# Patient Record
Sex: FEMALE | Race: WHITE
[De-identification: names, ages, dates, MRNs, and addresses within clinical notes are randomized per-mention and may not be internally consistent; named-entity substitution may affect disease eponyms.]

---

## 2018-07-31 ENCOUNTER — HOSPITAL ENCOUNTER (OUTPATIENT)
Dept: HOSPITAL 47 - RADUSWWP | Age: 48
Discharge: HOME | End: 2018-07-31
Attending: PHYSICAL MEDICINE & REHABILITATION
Payer: COMMERCIAL

## 2018-07-31 DIAGNOSIS — M25.452: ICD-10-CM

## 2018-07-31 DIAGNOSIS — M16.10: ICD-10-CM

## 2018-07-31 DIAGNOSIS — M25.852: Primary | ICD-10-CM

## 2018-07-31 DIAGNOSIS — M25.851: ICD-10-CM

## 2018-07-31 DIAGNOSIS — E66.9: ICD-10-CM

## 2018-07-31 NOTE — US
EXAMINATION TYPE:  US MSK bilateral hips, gluteal tendons 

 

DATE OF EXAM: 7/31/2018

 

COMPARISON: NONE

 

CLINICAL HISTORY: 48-year-old female with bilateral hip pain for 6 years, worsening over the last 2 y
ears. Pain is worse when standing and wall thickening while driving. M76.0, M76.02  Gluteal tendiniti
s, Bilateral hip.

 

Technique: Targeted sonographic examination along the lateral aspect of the hips to assess the latera
l gluteus medius insertions.

 

FINDINGS:

On the right, the insertion of the gluteus medius onto the lateral facet of the greater trochanter ha
s a hypoechoic appearance. There is mild enthesopathic change present. No lewis tear is identified or
 significant effusion.

 

On the left, there is bony irregularity along the lateral facet of the greater trochanter with hypoec
hoic appearance to the insertional fibers of the gluteus medius here. Possible fluid undercutting a p
ortion of the insertion especially at the sites of greater bony irregularity, for example, images 29,
 30, and 38. A small effusion in the subgluteus medius bursa may be present, image 31. No retracted t
ear is seen.

 

Exam limitations due to prominent subcutaneous adipose layer.

 

 

 

IMPRESSION:  

1. Targeted scanning along the lateral insertion of the bilateral gluteus medius.

2. 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

3. There is an underlying small subgluteus medius bursal effusion on the left as well.

4. If confirmation or further characterization is desired, MRI could be considered.

## 2018-08-09 ENCOUNTER — HOSPITAL ENCOUNTER (OUTPATIENT)
Dept: HOSPITAL 47 - LABWHC1 | Age: 48
Discharge: HOME | End: 2018-08-09
Payer: COMMERCIAL

## 2018-08-09 DIAGNOSIS — E27.40: Primary | ICD-10-CM

## 2018-08-09 PROCEDURE — 82024 ASSAY OF ACTH: CPT

## 2018-08-09 PROCEDURE — 36415 COLL VENOUS BLD VENIPUNCTURE: CPT

## 2018-08-09 PROCEDURE — 82533 TOTAL CORTISOL: CPT

## 2022-06-16 ENCOUNTER — HOSPITAL ENCOUNTER (OUTPATIENT)
Dept: HOSPITAL 47 - SLEEP | Age: 52
End: 2022-06-16
Attending: INTERNAL MEDICINE
Payer: MEDICARE

## 2022-06-16 DIAGNOSIS — I10: ICD-10-CM

## 2022-06-16 DIAGNOSIS — Z90.13: ICD-10-CM

## 2022-06-16 DIAGNOSIS — Z88.0: ICD-10-CM

## 2022-06-16 DIAGNOSIS — Z87.19: ICD-10-CM

## 2022-06-16 DIAGNOSIS — Z87.39: ICD-10-CM

## 2022-06-16 DIAGNOSIS — Z88.1: ICD-10-CM

## 2022-06-16 DIAGNOSIS — Z85.3: ICD-10-CM

## 2022-06-16 DIAGNOSIS — J45.909: ICD-10-CM

## 2022-06-16 DIAGNOSIS — Z87.891: ICD-10-CM

## 2022-06-16 DIAGNOSIS — E66.9: ICD-10-CM

## 2022-06-16 DIAGNOSIS — G47.33: Primary | ICD-10-CM

## 2022-06-16 PROCEDURE — 99202 OFFICE O/P NEW SF 15 MIN: CPT

## 2022-06-16 NOTE — CONS
CONSULTATION



DATE OF SERVICE:

06/16/2022



52-year-old lady has been evaluated in [facility] Center for possible obstructive sleep

apnea-hypopnea syndrome.



HISTORY OF PRESENT ILLNESS SLEEP-WAKE EVALUATION:



SLEEP SCHEDULE:

Patient's usual sleep schedule from about midnight until 6 or 8:00 am.



FALLING ASLEEP:

No problems with falling asleep, although she has TV set in bedroom.



DURING SLEEP:

She usually sleeps on the back position.  She has loud snoring and according to

according to her family, she has episodes of stopped breathing during sleep.  She wakes

up from sleep 2 times with nocturia, dry mouth, episodes of grinding teeth, choking.



No history of hypnagogic hallucinations, sleep paralysis or cataplexy.



DURING THE DAY/SLEEP WAKE EVALUATION:

In the morning, the patient wakes up tired, has difficulties paying attention, falling

asleep during the day, has problems with memory, concentration, irritability,

depression and anxiety.  [place] Sleepiness Scale is significantly increased to 16.



The patient may take naps 2 times around 10:00 am and 2:00 pm during the day.



PAST MEDICAL HISTORY:

Positive for hypertension, asthma, arthritis, fibromyalgia, breast CA.



PAST SURGICAL HISTORY:

Bilateral mastectomy, lap band surgery



MEDICATIONS:

Lyrica 75 mg once a day, albuterol inhaler, Naprosyn, amlodipine 5 mg once a day,

Duloxetine 60 mg once a day, losartan hydrochlorothiazide 100/25 mg once a day,

omeprazole 20 mg once a day.



SOCIAL HISTORY:

Positive for smoking for about 20 pack years, quit 5 years ago.  Alcohol consumption

occasional.



FAMILY HISTORY:

Hypertension, heart problems, stroke, sleep apnea, diabetes.



REVIEW OF SYSTEMS:

Multiple awakenings from sleep, significant sleepiness during the day, loud snoring.



PHYSICAL EXAMINATION:

GENERAL:  lady without distress.

/82, HR 80, RR 16, height 5 feet 5-3/4 inches and weight 283.0 pounds, body mass

index 46.0.  Temperature 96.6, oxygen saturation at room air 95%.  Oropharynx extremely

low position of soft palate, Mallampati 4.

Neck is wide 18 inches in circumference.

NECK:  Supple, no JVD.  Thyroid is not palpable.

LUNGS:  Clear to percussion and to auscultation.  Good air exchange.  No wheezing or

rhonchi.

HEART:  S1, S2 regular.  No murmurs, gallops, or rubs.

ABDOMEN: Obese. Soft and nontender.  Bowel sounds are present.  No organomegaly

appreciated.

EXTREMITIES: No clubbing or cyanosis.

CNS:  Awake, alert, and oriented X3.  Cranial nerves 2 to 7 intact.  There is no

fasciculation or atrophy. noted.  No focal deficits observed.



IMPRESSION:

1. Loud snoring, witnessed episodes of stopped breathing during sleep, extremely low

    position of soft palate, Mallampati 4, wide neck, 18 inches in circumference,

    significant excessive daytime sleepiness by [place] Sleepiness Scale, obstructive

    sleep apnea-hypopnea syndrome.

2. Obesity; body mass index 46.

3. Hypertension.

4. History of breast cancer, status post bilateral mastectomy.

5. Asthma.

6. History of acid reflux.

7. History of fibromyalgia.

8. History of arthritis.



PLAN:

1. Polysomnography for evaluation of patient's breathing during sleep.

2. CPAP/BiPAP titration if sleep study confirms obstructive sleep apnea-hypopnea

    syndrome.

3. Preferable position during sleep on the side.

4. No driving if patient feels any sleepiness.

5. I will see patient for follow up visit to explain results of testing and following

    plan.



Thank you very much for referring this patient for consultation.



Sincerely,









Roderick Monroe MD, PhD, FAASM

Diplomat of American Board of Medical Specialties

Sleep Medicine Board of American Board of Internal Medicine

Medical Director of [facility] [place]





MMODL / ELIEZERN: [id number] / Job#: [id number]

## 2022-09-06 ENCOUNTER — HOSPITAL ENCOUNTER (OUTPATIENT)
Dept: HOSPITAL 47 - ORWHC2ENDO | Age: 52
Discharge: HOME | End: 2022-09-06
Attending: SURGERY
Payer: MEDICARE

## 2022-09-06 VITALS — HEART RATE: 68 BPM | SYSTOLIC BLOOD PRESSURE: 114 MMHG | DIASTOLIC BLOOD PRESSURE: 82 MMHG

## 2022-09-06 VITALS — RESPIRATION RATE: 18 BRPM | TEMPERATURE: 97.7 F

## 2022-09-06 DIAGNOSIS — I10: ICD-10-CM

## 2022-09-06 DIAGNOSIS — Z98.84: ICD-10-CM

## 2022-09-06 DIAGNOSIS — Z88.1: ICD-10-CM

## 2022-09-06 DIAGNOSIS — Z99.89: ICD-10-CM

## 2022-09-06 DIAGNOSIS — Z87.891: ICD-10-CM

## 2022-09-06 DIAGNOSIS — K29.50: ICD-10-CM

## 2022-09-06 DIAGNOSIS — Z85.3: ICD-10-CM

## 2022-09-06 DIAGNOSIS — M79.7: ICD-10-CM

## 2022-09-06 DIAGNOSIS — Z98.891: ICD-10-CM

## 2022-09-06 DIAGNOSIS — Z83.3: ICD-10-CM

## 2022-09-06 DIAGNOSIS — Z88.0: ICD-10-CM

## 2022-09-06 DIAGNOSIS — Z82.49: ICD-10-CM

## 2022-09-06 DIAGNOSIS — Z12.11: Primary | ICD-10-CM

## 2022-09-06 DIAGNOSIS — K25.9: ICD-10-CM

## 2022-09-06 DIAGNOSIS — J45.909: ICD-10-CM

## 2022-09-06 DIAGNOSIS — Z80.7: ICD-10-CM

## 2022-09-06 DIAGNOSIS — G47.33: ICD-10-CM

## 2022-09-06 DIAGNOSIS — Z80.3: ICD-10-CM

## 2022-09-06 DIAGNOSIS — Z98.890: ICD-10-CM

## 2022-09-06 DIAGNOSIS — M19.90: ICD-10-CM

## 2022-09-06 DIAGNOSIS — Z79.899: ICD-10-CM

## 2022-09-06 DIAGNOSIS — K57.30: ICD-10-CM

## 2022-09-06 PROCEDURE — 81025 URINE PREGNANCY TEST: CPT

## 2022-09-06 PROCEDURE — 88305 TISSUE EXAM BY PATHOLOGIST: CPT

## 2022-09-06 PROCEDURE — 43239 EGD BIOPSY SINGLE/MULTIPLE: CPT

## 2022-09-06 NOTE — P.GSHP
History of Present Illness


H&P Date: 09/06/22


Chief Complaint: GERD, screening





52-year-old female with history of previous lap band and then subsequent 

conversion to sleeve gastrectomy.  Patient with intermittent reflux and 

occasional episodes of vomiting and regurgitation.  She had been off of her 

antiacids but recently resumed them.  Also due for screening colonoscopy.  

Patient says she has a history of anal fissures.  Occasional rectal bleeding.  

No family history of colon cancer.





Past Medical History


Past Medical History: Asthma, Cancer, Fibromyalgia, Hypertension, Sleep 

Apnea/CPAP/BIPAP


Additional Past Medical History / Comment(s): BREAST CANCER, in the process of 

arranging for cpap, arthritis generalized. DDD


History of Any Multi-Drug Resistant Organisms: None Reported


Past Surgical History: Bariatric Surgery, Uterine Ablation


Additional Past Surgical History / Comment(s): LAP BAND INSERT AND REMOVAL THEN 

GASTRIC SLEEVE.  EGD.  COLONOSCOPY, umbilical hernia repair 2020.  lumpectomy 

2017 bilateral mastectomy YAZMIN 1/2018


Past Anesthesia/Blood Transfusion Reactions: No Reported Reaction


Additional Past Anesthesia/Blood Transfusion Reaction / Comment(s): one time 

with anesthesia pt had the feeling of not being able to breathe felt she was not

fully out.  No problems with blood transfusions


Smoking Status: Former smoker





- Past Family History


  ** Mother


Family Medical History: Cancer, Coronary Artery Disease (CAD), Diabetes Mellitus


Additional Family Medical History / Comment(s): Breast Cancer, kidney issues





  ** Sister(s)


Family Medical History: Cancer


Additional Family Medical History / Comment(s): Hodgkins





Medications and Allergies


                                Home Medications











 Medication  Instructions  Recorded  Confirmed  Type


 


Losartan/Hydrochlorothiazide 1 each PO DAILY 10/09/17 09/06/22 History





[Losartan-Hctz 100-25 mg Tab]    


 


Naproxen [Naprosyn] 500 mg PO BID 10/09/17 09/06/22 History


 


Acetaminophen [Tylenol Arthritis] 1,300 mg PO TID 09/02/22 09/06/22 History


 


Albuterol Sulfate [Proair Hfa] 2 puff INHALATION DAILY PRN 09/02/22 09/06/22 

History


 


Cholecalciferol [Vitamin D3 (25 50 mcg PO DAILY 09/02/22 09/02/22 History





Mcg = 1000 Iu)]    


 


DULoxetine HCL [Cymbalta] 60 mg PO BID 09/02/22 09/06/22 History


 


Docusate [Colace] 100 mg PO DAILY 09/02/22 09/06/22 History


 


Ferrous Sulfate [Iron] 325 mg PO DAILY 09/02/22 09/02/22 History


 


Lidocaine 5% Patch [Lidoderm 5% 1 applic TOPICAL DAILY PRN 09/02/22 09/06/22 

History





Patch]    


 


Mv,Calcium,Min/Iron/Folic/Vitk 1 each PO DAILY 09/02/22 09/02/22 History





[One-A-Day Women's Complete Tab]    


 


Pregabalin [Lyrica] 75 mg PO TID 09/02/22 09/06/22 History


 


Unk Glucosamine/Chondroitin  2 tab PO DAILY 09/02/22 09/06/22 History


 


amLODIPine [Norvasc] 5 mg PO DAILY 09/02/22 09/02/22 History








                                    Allergies











Allergy/AdvReac Type Severity Reaction Status Date / Time


 


erythromycin base Allergy  Unknown Verified 09/06/22 08:47





   Childhood  


 


Penicillins Allergy  Unknown Verified 09/06/22 08:47





   Childhood  














Surgical - Exam


                                   Vital Signs











Temp Pulse Resp BP Pulse Ox


 


 97.7 F   87   18   118/70   95 


 


 09/06/22 08:49  09/06/22 08:49  09/06/22 08:49  09/06/22 08:49  09/06/22 08:49

















Physical exam:


General: Well-developed, well-nourished


HEENT: Normocephalic, sclerae nonicteric


Abdomen: Nontender, nondistended


Extremities: No edema


Neuro: Alert and oriented





Assessment and Plan


(1) Colon cancer screening


Narrative/Plan: 


Will proceed with upper and lower endoscopy at this time.


Current Visit: Yes   Status: Acute   Code(s): Z12.11 - ENCOUNTER FOR SCREENING 

FOR MALIGNANT NEOPLASM OF COLON   SNOMED Code(s): 264729971

## 2022-09-06 NOTE — P.PCN
Date of Procedure: 09/06/22


Procedure(s) Performed: 


PREOPERATIVE DIAGNOSIS: GERD, screening


POSTOPERATIVE DIAGNOSIS: Gastritis, small gastric ulcer, diverticulosis


PROCEDURE: 1.  EGD with biopsy 2.  Colonoscopy 


ANESTHESIA: MAC


SURGEON: Mustapha Solis M.D.


SPECIMENS: Antrum


ENDOSCOPIC PROCEDURE: The patient was on the endoscopy table in the left 

decubitus position.  The Olympus gastroscope was inserted into the oropharynx 

and passed under direct visualization to the region of the third portion of the 

duodenum.  From that point the scope was slowly withdrawn inspecting all 

surfaces carefully.  There were no neoplastic inflammatory or polypoid lesions 

throughout the duodenum.  The pylorus was widely patent.  The stomach was car

efully inspected.  There was gastritis present.  The patient had evidence of 

previous sleeve gastrectomy.  No retroflexion took place.  In the mid aspect of 

the sleeve there was a small less than 1 cm relatively superficial ulceration 

present.  The proximal sleeve appeared normal.  There was no visible hiatal 

hernia.  The esophagus was then carefully examined.  There were no neoplastic 

inflammatory or polypoid lesions throughout the visualized esophagus.


     The patient was kept on the endoscopy table in the left decubitus position.

 The Olympus colonoscope was inserted into the anus and passed under direct 

visualization to the base of the cecum.  The appendiceal orifice was visualized.

 From that point the scope was slowly withdrawn inspecting all surfaces 

carefully.  There were no neoplastic inflammatory or polypoid lesions throughout

the cecum, ascending, transverse, descending, sigmoid and rectum.  There was 

mild scattered diverticulosis noted.  Digital rectal examination was normal.  

The patient was taken to the recovery room in stable condition per anesthesia 

guidelines.


RECOMMENDATIONS: Resume diet.  Continue antiacid therapy.  Will require repeat 

endoscopy in 3 months to confirm resolution of ulceration.

## 2022-12-14 ENCOUNTER — HOSPITAL ENCOUNTER (OUTPATIENT)
Dept: HOSPITAL 47 - SLEEP | Age: 52
End: 2022-12-14
Payer: MEDICARE

## 2022-12-14 DIAGNOSIS — G47.33: Primary | ICD-10-CM

## 2022-12-14 DIAGNOSIS — E66.9: ICD-10-CM

## 2022-12-14 DIAGNOSIS — Z87.19: ICD-10-CM

## 2022-12-14 DIAGNOSIS — I10: ICD-10-CM

## 2022-12-14 DIAGNOSIS — J45.909: ICD-10-CM

## 2022-12-14 DIAGNOSIS — Z99.89: ICD-10-CM

## 2022-12-14 DIAGNOSIS — Z88.1: ICD-10-CM

## 2022-12-14 DIAGNOSIS — Z90.13: ICD-10-CM

## 2022-12-14 DIAGNOSIS — Z88.0: ICD-10-CM

## 2022-12-14 DIAGNOSIS — Z87.39: ICD-10-CM

## 2022-12-14 DIAGNOSIS — Z87.891: ICD-10-CM

## 2022-12-14 PROCEDURE — 99212 OFFICE O/P EST SF 10 MIN: CPT

## 2022-12-14 NOTE — P.PN
Subjective


DATE: 12/14/2022





FOLLOW UP VISIT.





Patient with obstructive sleep apnea hypopnea syndrome return to sleep center 

for follow-up visit.  Recently patient had sleep study which documented 

obstructive sleep apnea hypopnea syndrome.  I discussed with patient results of 

sleep studies in details Patient was initiated on  BPAP therapy and today is 

first visit after treatment was started.  


 Patient was able to use BPAP equipment most of the nights but not for the whole

night.


The patient does not have significant problems with the mask, BPAP pressure and 

humidification. [place] sleepiness scale is increased to 15.


I checked information from BPAP unit. 


BPAP maximal inspiratory pressure 24, minimal expiratory pressure 10, pressure-

support 4  cm H2O. 


Usage is 80% and 33 % for more then 4 hours, average 3.5 hours per night. 


Leak is increased to 44.8 l/m. 


Apnea Hypopnea Index is 2.8, which is normal.  





MEDICATIONS:1.  Lyrica 75 mg once a day


                          2.  Albuterol


                          3.  Amlodipine 5 mg once a day


                          4.  Losartan [id number]/25 milligrams once

a day


                          5.  Omeprazole 20 mg once a day


                     





During physical exam:


GENERAL: A pleasant patient without any distress. 


VITAL SIGNS: /83, HR 69, RR 18 , weight 276.8, temperature 97.1, oxygen 

saturation at room air 96% .


HEENT: PERRLA, EOMI.low position of soft palate, Mallapati 4 .


NECK: Supple. No JVD. 


LUNGS: Clear to percussion and to auscultation. Good air exchange. No wheezing 

or rhonchi. 


HEART: S1, S2 regular. 


ABDOMEN: Soft and nontender.  Slightly obese  


EXTREMITIES: No clubbing or cyanosis. 


CNS: Awake, alert, and oriented x3.  No focal deficit. 





Impressions:


1.  Obstructive sleep apnea-hypopnea syndrome in severe range apnea-hypopnea 

index 49.  Patient patient is using BiPAP equipment 80% of nights, but not 

always more than 4 hours, benefiting from treatment.


2.  Obesity.


3.  Hypertension.


4.  History of breast cancer, status post bilateral mastectomy.


5.  History of acid reflux.


6.  Asthma.


7.  History of fibromyalgia.


8.  History of arthritis.








Plan:


1.  Continue using PAP equipment every night for the whole night.  Patient 

promised to follow recommendations


2.  To change air filter at least 1-2 times per month.


3.  PAP unit should stay lower then position of the head.


4.  Advised patient to remove all remaining water from humidifier canister daily

and make it dry after each usage. Refill canister with fresh distilled water 

before each usage. 


5.  Sleep hygiene with regular time in bed for at least 8 hours.


6.  Precautions related to driving. No driving if feel any sleepiness.


7.  I will maintain prescription for PAP supplies including mask, tube, filters.


8. Follow up visit in 6 months or earlier if patient has any problems.


9. Watching and losing weight.








Thank you very much for allowing me to participate in the management of your 

patient.








Roderick Monroe MD, PhD, FAASM.


Diplomat of American Board of Sleep Medicine,


Sleep Medicine Board by American Board of Internal Medicine


Medical Director of [facility]

## 2023-01-10 ENCOUNTER — HOSPITAL ENCOUNTER (OUTPATIENT)
Dept: HOSPITAL 47 - ORWHC2ENDO | Age: 53
Discharge: HOME | End: 2023-01-10
Attending: SURGERY
Payer: MEDICARE

## 2023-01-10 VITALS — TEMPERATURE: 98 F

## 2023-01-10 VITALS — HEART RATE: 86 BPM | DIASTOLIC BLOOD PRESSURE: 84 MMHG | RESPIRATION RATE: 16 BRPM | SYSTOLIC BLOOD PRESSURE: 123 MMHG

## 2023-01-10 VITALS — BODY MASS INDEX: 42.3 KG/M2

## 2023-01-10 DIAGNOSIS — Z87.891: ICD-10-CM

## 2023-01-10 DIAGNOSIS — Z90.13: ICD-10-CM

## 2023-01-10 DIAGNOSIS — Z79.1: ICD-10-CM

## 2023-01-10 DIAGNOSIS — G47.30: ICD-10-CM

## 2023-01-10 DIAGNOSIS — Z85.3: ICD-10-CM

## 2023-01-10 DIAGNOSIS — Z82.49: ICD-10-CM

## 2023-01-10 DIAGNOSIS — M79.7: ICD-10-CM

## 2023-01-10 DIAGNOSIS — K25.9: ICD-10-CM

## 2023-01-10 DIAGNOSIS — Z88.1: ICD-10-CM

## 2023-01-10 DIAGNOSIS — I10: ICD-10-CM

## 2023-01-10 DIAGNOSIS — Z80.3: ICD-10-CM

## 2023-01-10 DIAGNOSIS — Z83.3: ICD-10-CM

## 2023-01-10 DIAGNOSIS — M19.90: ICD-10-CM

## 2023-01-10 DIAGNOSIS — Z88.0: ICD-10-CM

## 2023-01-10 DIAGNOSIS — J45.909: ICD-10-CM

## 2023-01-10 DIAGNOSIS — K29.50: Primary | ICD-10-CM

## 2023-01-10 DIAGNOSIS — K21.9: ICD-10-CM

## 2023-01-10 PROCEDURE — 43239 EGD BIOPSY SINGLE/MULTIPLE: CPT

## 2023-01-10 PROCEDURE — 88305 TISSUE EXAM BY PATHOLOGIST: CPT

## 2023-01-10 NOTE — P.GSHP
History of Present Illness


H&P Date: 01/10/23


Chief Complaint: Gastric ulcer





52-year-old female here today for repeat upper endoscopy.  Last EGD performed in

September.  Patient had a small gastric ulcer and gastritis at that time.  

Biopsies showed mild gastritis with no H. pylori seen.  Here today for repeat.  

Patient previously was doing Naprosyn 3 times per day.  She is now doing Motrin 

200 mg 3 times per day.





Past Medical History


Past Medical History: Asthma, Cancer, Fibromyalgia, GERD/Reflux, Hypertension, 

Sleep Apnea/CPAP/BIPAP


Additional Past Medical History / Comment(s): right BREAST CANCER, cpap, 

arthritis generalized, DDD, stomach ulcer, bilateral hip tendonitis, bilateral 

hands with arthritis needing surgery


History of Any Multi-Drug Resistant Organisms: None Reported


Past Surgical History: Bariatric Surgery, Uterine Ablation


Additional Past Surgical History / Comment(s): LAP BAND INSERT AND REMOVAL THEN 

GASTRIC SLEEVE.  EGD.  COLONOSCOPY, umbilical hernia repair 2020.  lumpectomy 

2017 bilateral mastectomy 2018


Past Anesthesia/Blood Transfusion Reactions: No Reported Reaction


Additional Past Anesthesia/Blood Transfusion Reaction / Comment(s): one time 

with anesthesia pt had the feeling of not being able to breathe while still 

awake.  multiple blood transfusions after sleeve with out any reactions


Smoking Status: Former smoker





- Past Family History


  ** Mother


Family Medical History: Cancer, Coronary Artery Disease (CAD), Diabetes Mellitus


Additional Family Medical History / Comment(s): Breast Cancer, kidney issues, ? 

factor V





  ** Sister(s)


Family Medical History: Cancer


Additional Family Medical History / Comment(s): Hodgkins





Medications and Allergies


                                Home Medications











 Medication  Instructions  Recorded  Confirmed  Type


 


Losartan/Hydrochlorothiazide 1 each PO DAILY 10/09/17 01/10/23 History





[Losartan-Hctz 100-25 mg Tab]    


 


Acetaminophen [Tylenol Arthritis] 1,300 mg PO TID 09/02/22 01/10/23 History


 


Albuterol Sulfate [Proair Hfa] 2 puff INHALATION DAILY PRN 09/02/22 01/10/23 

History


 


Cholecalciferol [Vitamin D3 (25 50 mcg PO DAILY 09/02/22 01/10/23 History





Mcg = 1000 Iu)]    


 


DULoxetine HCL [Cymbalta] 60 mg PO BID 09/02/22 01/10/23 History


 


Docusate [Colace] 100 mg PO DAILY 09/02/22 01/10/23 History


 


Ferrous Sulfate [Iron] 325 mg PO DAILY 09/02/22 01/10/23 History


 


Lidocaine 5% Patch [Lidoderm 5% 1 applic TOPICAL DAILY PRN 09/02/22 01/10/23 

History





Patch]    


 


Mv,Calcium,Min/Iron/Folic/Vitk 1 each PO DAILY 09/02/22 01/10/23 History





[One-A-Day Women's Complete Tab]    


 


Pregabalin [Lyrica] 100 mg PO TID 09/02/22 01/10/23 History


 


amLODIPine [Norvasc] 5 mg PO DAILY 09/02/22 01/10/23 History


 


Ibuprofen [Motrin] 400 mg PO TID 01/05/23 01/10/23 History


 


Omeprazole [PriLOSEC] 20 mg PO DAILY 01/05/23 01/10/23 History








                                    Allergies











Allergy/AdvReac Type Severity Reaction Status Date / Time


 


erythromycin base Allergy  Unknown Verified 01/10/23 10:05





   Childhood  


 


Penicillins Allergy  Unknown Verified 01/10/23 10:05





   Childhood  














Surgical - Exam


                                   Vital Signs











Temp Pulse Resp BP Pulse Ox


 


 98 F   82   16   130/83   98 


 


 01/10/23 09:57  01/10/23 09:57  01/10/23 09:57  01/10/23 09:57  01/10/23 09:57

















Physical exam:


General: Well-developed, well-nourished


HEENT: Normocephalic, sclerae nonicteric


Abdomen: Nontender, nondistended


Extremities: No edema


Neuro: Alert and oriented





Assessment and Plan


(1) Gastric ulcer


Narrative/Plan: 


Will proceed with upper endoscopy


Current Visit: Yes   Status: Acute   Code(s): K25.9 - GASTRIC ULCER, UNSP AS ACU

TE OR CHRONIC, W/O HEMOR OR PERF   SNOMED Code(s): 286849505

## 2023-01-10 NOTE — P.PCN
Date of Procedure: 01/10/23


Procedure(s) Performed: 


Preoperative Dx: Gastric ulcer


Postoperative Dx: Gastritis


Procedure: EGD with Bx


Anesthesia: Sedation


Endoscopist: Dr. Solis


Specimens: Antrum


Endoscopic Procedure:   The patient was on the endoscopy table in the left 

decubitus position.  The Olympus gastroscope was inserted into the oropharynx 

and passed under direct visualization to the region of the third portion of the 

duodenum.  From that point the scope was slowly withdrawn inspecting all 

surfaces carefully.  There were no neoplastic inflammatory or polypoid lesions 

throughout the duodenum.  The pylorus was widely patent.  The stomach was 

carefully inspected.  There was gastritis present throughout the sleeve and the 

antrum.  A biopsy of the antrum took place no retroflexion took place.  There 

was slight narrowing at the distal aspect of the sleeve staple line.  The 

patient had no identifiable hiatal hernia and esophagus normal.  The patient was

then taken to the recovery room in stable condition per anesthesia guidelines.


Recommendations: Await biopsy results.  Continue Motrin 200 daily